# Patient Record
Sex: MALE | Race: BLACK OR AFRICAN AMERICAN | NOT HISPANIC OR LATINO | Employment: UNEMPLOYED | URBAN - METROPOLITAN AREA
[De-identification: names, ages, dates, MRNs, and addresses within clinical notes are randomized per-mention and may not be internally consistent; named-entity substitution may affect disease eponyms.]

---

## 2024-06-19 ENCOUNTER — PATIENT MESSAGE (OUTPATIENT)
Dept: URGENT CARE | Facility: CLINIC | Age: 39
End: 2024-06-19

## 2024-12-16 ENCOUNTER — ON-DEMAND VIRTUAL (OUTPATIENT)
Dept: URGENT CARE | Facility: CLINIC | Age: 39
End: 2024-12-16
Payer: MEDICAID

## 2024-12-16 DIAGNOSIS — R21 RASH: ICD-10-CM

## 2024-12-16 DIAGNOSIS — R39.89 GENITAL SORE: Primary | ICD-10-CM

## 2024-12-16 PROCEDURE — 99213 OFFICE O/P EST LOW 20 MIN: CPT | Mod: 95,,, | Performed by: NURSE PRACTITIONER

## 2024-12-16 NOTE — PROGRESS NOTES
Subjective:      Patient ID: Arnulfo Shepard is a 39 y.o. male.    Vitals:  vitals were not taken for this visit.     Chief Complaint: Rash      Visit Type: TELE AUDIOVISUAL    Present with the patient at the time of consultation: TELEMED PRESENT WITH PATIENT: None    Patient Location: Home      No past medical history on file.  No past surgical history on file.  Review of patient's allergies indicates:  No Known Allergies  Current Outpatient Medications on File Prior to Visit   Medication Sig Dispense Refill    ketorolac (TORADOL) 10 mg tablet Take 1 tablet (10 mg total) by mouth every 6 (six) hours. 20 tablet 0     No current facility-administered medications on file prior to visit.     No family history on file.        [facility] Intake    12/16/2024 11:47 AM CST - Filed by Patient   What is your current physical address in the event of a medical emergency? [address] [facility] [address] [address]   Are you able to take your vital signs? Yes   Systolic Blood Pressure: 124   Diastolic Blood Pressure: 74   Weight: 169.5   Height: 76   Pulse: 85   Temperature: 97.8   Respiration rate:    Pulse Oxygen:    Please attach any relevant images or files    Is your employer contracted with [facility]? No         38 y/o male with c/o nonhealing bumps/sore to testicles with opened center x3 weeks with diffused rash throughout body. Pt also intermittent chest pain; not reproducible and  joint and muscle soreness to neck and bilateral wrist. Denies fever, chills, SOB or diaphoresis.         Genitourinary:  Positive for genital sore.   Musculoskeletal:  Positive for joint pain.   Skin:  Positive for rash.        Objective:   The physical exam was conducted virtually.  Physical Exam   Constitutional: He is oriented to person, place, and time. He is cooperative. He does not appear ill. No distress. well-groomedawake  HENT:   Head: Normocephalic.   Abdominal: Normal appearance.   Neurological: He is alert and oriented to  person, place, and time.   Psychiatric: Judgment normal.       Assessment:     1. Genital sore    2. Rash        Plan:     Genital sore    Rash      Unfortunately, on this platform we cannot evaluate your concern appropriately and you will need an in-person evaluation.    Patient encouraged to monitor symptoms closely and instructed to follow-up for new or worsening symptoms. Further, in-person, evaluation may be necessary for continued treatment. Please follow up with your primary care doctor or specialist as needed. Verbally discussed plan. Patient confirms understanding and is in agreement with treatment and plan.      You must understand that you've received a [facility] evaluation only and that you may be released before all your medical problems are known or treated. You, the patient, will arrange for follow up care as instructed.